# Patient Record
Sex: FEMALE | URBAN - METROPOLITAN AREA
[De-identification: names, ages, dates, MRNs, and addresses within clinical notes are randomized per-mention and may not be internally consistent; named-entity substitution may affect disease eponyms.]

---

## 2017-05-26 ENCOUNTER — APPOINTMENT (RX ONLY)
Dept: URBAN - METROPOLITAN AREA OTHER 9 | Facility: OTHER | Age: 57
Setting detail: DERMATOLOGY
End: 2017-05-26

## 2017-05-26 DIAGNOSIS — L92.0 GRANULOMA ANNULARE: ICD-10-CM

## 2017-05-26 DIAGNOSIS — L24.9 IRRITANT CONTACT DERMATITIS, UNSPECIFIED CAUSE: ICD-10-CM

## 2017-05-26 PROCEDURE — ? PATIENT SPECIFIC COUNSELING

## 2017-05-26 PROCEDURE — 99202 OFFICE O/P NEW SF 15 MIN: CPT

## 2017-05-26 PROCEDURE — ? PRESCRIPTION

## 2017-05-26 PROCEDURE — ? TREATMENT REGIMEN

## 2017-05-26 PROCEDURE — ? COUNSELING

## 2017-05-26 PROCEDURE — ? OTHER

## 2017-05-26 RX ORDER — CLOBETASOL PROPIONATE 0.5 MG/G
OINTMENT TOPICAL BID
Qty: 1 | Refills: 2 | Status: ERX | COMMUNITY
Start: 2017-05-26

## 2017-05-26 RX ADMIN — CLOBETASOL PROPIONATE: 0.5 OINTMENT TOPICAL at 00:00

## 2017-05-26 ASSESSMENT — LOCATION SIMPLE DESCRIPTION DERM
LOCATION SIMPLE: LEFT HAND
LOCATION SIMPLE: LEFT ELBOW

## 2017-05-26 ASSESSMENT — LOCATION DETAILED DESCRIPTION DERM
LOCATION DETAILED: LEFT ELBOW
LOCATION DETAILED: 2ND WEB SPACE LEFT HAND

## 2017-05-26 ASSESSMENT — LOCATION ZONE DERM
LOCATION ZONE: HAND
LOCATION ZONE: ARM

## 2017-05-26 NOTE — PROCEDURE: OTHER
Detail Level: Simple
Note Text (......Xxx Chief Complaint.): This diagnosis correlates with the
Other (Free Text): Advised patient of treatment options informed patient that we could biopsy site, inject with steroid or prescribe a topical steroid, patient opted to use topical steroid discussed if condition is not better in 3 to 6 months patient will come in to get steroid injection. Patient and patient  understood and agreed with plan.